# Patient Record
Sex: FEMALE | Race: OTHER | URBAN - METROPOLITAN AREA
[De-identification: names, ages, dates, MRNs, and addresses within clinical notes are randomized per-mention and may not be internally consistent; named-entity substitution may affect disease eponyms.]

---

## 2018-02-19 ENCOUNTER — OFFICE VISIT (OUTPATIENT)
Dept: FAMILY MEDICINE CLINIC | Facility: CLINIC | Age: 23
End: 2018-02-19

## 2018-02-19 VITALS
HEART RATE: 70 BPM | RESPIRATION RATE: 14 BRPM | DIASTOLIC BLOOD PRESSURE: 60 MMHG | WEIGHT: 117.13 LBS | BODY MASS INDEX: 24.26 KG/M2 | OXYGEN SATURATION: 98 % | TEMPERATURE: 98 F | HEIGHT: 58.25 IN | SYSTOLIC BLOOD PRESSURE: 106 MMHG

## 2018-02-19 DIAGNOSIS — Z00.00 ROUTINE ADULT HEALTH MAINTENANCE: Primary | ICD-10-CM

## 2018-02-19 LAB — PREGNANCY TEST, URINE: NEGATIVE

## 2018-02-19 PROCEDURE — 99395 PREV VISIT EST AGE 18-39: CPT | Performed by: FAMILY MEDICINE

## 2018-02-19 PROCEDURE — 88175 CYTOPATH C/V AUTO FLUID REDO: CPT | Performed by: FAMILY MEDICINE

## 2018-02-19 NOTE — PROGRESS NOTES
Chief Complaint:   Patient presents with:  Physical      HPI:   This is a 26year old female coming in for healthcare check. Patient with child its 3-3/1years old last Pap smear was at the beginning of pregnancy. She has had no abnormal Pap smears.   Swapna Angles as calculated from the following:    Height as of this encounter: 58.25\". Weight as of this encounter: 117 lb 2 oz. Vital signs reviewed. Appears stated age, well groomed.     Physical Exam:    GEN:  Patient is alert, awake and oriented, well developed MD  2/19/2018  4:57 PM

## 2018-02-19 NOTE — PROGRESS NOTES
External lab entered from [facility] (A [facility]AMP[facility] OF [facility]).

## 2018-02-21 ENCOUNTER — TELEPHONE (OUTPATIENT)
Dept: FAMILY MEDICINE CLINIC | Facility: CLINIC | Age: 23
End: 2018-02-21

## 2018-02-21 NOTE — TELEPHONE ENCOUNTER
----- Message from Helen Leon MD sent at 2/21/2018  2:37 PM CST -----  Pap result reviewed - negative. Recheck in 1 year.

## 2018-06-22 ENCOUNTER — TELEPHONE (OUTPATIENT)
Dept: FAMILY MEDICINE CLINIC | Facility: CLINIC | Age: 23
End: 2018-06-22

## 2018-06-22 NOTE — TELEPHONE ENCOUNTER
Pt is wanting to have birth control in her arm removed. [facility] and states she does not want to have them remove it. Pt is wondering if Dr. Jose Marsh can remove it?

## 2018-06-25 NOTE — TELEPHONE ENCOUNTER
I do note remove implants;  I do not believe any other providers here do removal.     Recommend patient check with [facility] - I believe that dr. Usha Dias does this.

## 2018-08-08 ENCOUNTER — OFFICE VISIT (OUTPATIENT)
Dept: FAMILY MEDICINE CLINIC | Facility: CLINIC | Age: 23
End: 2018-08-08
Payer: COMMERCIAL

## 2018-08-08 VITALS
RESPIRATION RATE: 14 BRPM | SYSTOLIC BLOOD PRESSURE: 108 MMHG | DIASTOLIC BLOOD PRESSURE: 72 MMHG | BODY MASS INDEX: 24.06 KG/M2 | HEART RATE: 70 BPM | HEIGHT: 58.25 IN | WEIGHT: 116.19 LBS | OXYGEN SATURATION: 99 % | TEMPERATURE: 97 F

## 2018-08-08 DIAGNOSIS — L20.84 INTRINSIC ECZEMA: Primary | ICD-10-CM

## 2018-08-08 PROCEDURE — 99214 OFFICE O/P EST MOD 30 MIN: CPT | Performed by: NURSE PRACTITIONER

## 2018-08-08 RX ORDER — CLOTRIMAZOLE AND BETAMETHASONE DIPROPIONATE 10; .64 MG/G; MG/G
1 CREAM TOPICAL 2 TIMES DAILY PRN
Qty: 45 G | Refills: 0 | Status: SHIPPED | OUTPATIENT
Start: 2018-08-08

## 2018-08-08 NOTE — PROGRESS NOTES
HPI:    Patient ID: Teagan Howell is a 26year old female. HPI     Complaints of dry, itchy skin to the back of both legs and to her forearms. Has been treated for this in the past. Tends to come and go - worse in the summer.      States that she notes Intrinsic eczema  (primary encounter diagnosis)    No orders of the defined types were placed in this encounter.       Meds This Visit:  Signed Prescriptions Disp Refills    clotrimazole-betamethasone 1-0.05 % External Cream 45 g 0      Sig: Apply 1 Applica

## 2018-08-08 NOTE — PATIENT INSTRUCTIONS
Apply the cream twice a day to the affected areas until resolved. Return to clinic if worsens or not improving. Follow-up as needed.

## 2018-11-12 ENCOUNTER — TELEPHONE (OUTPATIENT)
Dept: FAMILY MEDICINE CLINIC | Facility: CLINIC | Age: 23
End: 2018-11-12

## 2018-11-12 DIAGNOSIS — Z00.00 HEALTHCARE MAINTENANCE: Primary | ICD-10-CM

## 2018-11-23 ENCOUNTER — NURSE ONLY (OUTPATIENT)
Dept: FAMILY MEDICINE CLINIC | Facility: CLINIC | Age: 23
End: 2018-11-23
Payer: COMMERCIAL

## 2018-11-23 VITALS — TEMPERATURE: 98 F

## 2018-11-23 DIAGNOSIS — Z00.00 ROUTINE ADULT HEALTH MAINTENANCE: ICD-10-CM

## 2018-11-23 PROCEDURE — 86580 TB INTRADERMAL TEST: CPT | Performed by: FAMILY MEDICINE

## 2018-11-23 NOTE — PROGRESS NOTES
Patient here for PPD skin test.     Patient needs for job at [employer]. PPD placed in right forearm. Patient tolerated test well and left in stable condition.      Patient instructed to come back for reading on Monday before 9:30am.     Anika

## 2018-11-26 ENCOUNTER — PATIENT OUTREACH (OUTPATIENT)
Dept: FAMILY MEDICINE CLINIC | Facility: CLINIC | Age: 23
End: 2018-11-26

## 2018-11-26 NOTE — PROGRESS NOTES
Patient was given PPD on 11/23 with instructions to come back for reading on Monday before 9:30 a.m. The office was closed due to the weather. Per Nicolas Wakefield, patient will have to come back to repeat test and will not be charged for those services.
